# Patient Record
Sex: MALE | Race: WHITE | NOT HISPANIC OR LATINO | Employment: FULL TIME | URBAN - METROPOLITAN AREA
[De-identification: names, ages, dates, MRNs, and addresses within clinical notes are randomized per-mention and may not be internally consistent; named-entity substitution may affect disease eponyms.]

---

## 2017-03-04 ENCOUNTER — HOSPITAL ENCOUNTER (EMERGENCY)
Facility: OTHER | Age: 22
Discharge: HOME | End: 2017-03-04
Attending: EMERGENCY MEDICINE
Payer: MEDICAID

## 2017-03-04 VITALS
SYSTOLIC BLOOD PRESSURE: 132 MMHG | HEIGHT: 70 IN | BODY MASS INDEX: 27.2 KG/M2 | OXYGEN SATURATION: 98 % | HEART RATE: 70 BPM | WEIGHT: 190 LBS | RESPIRATION RATE: 16 BRPM | DIASTOLIC BLOOD PRESSURE: 74 MMHG | TEMPERATURE: 97 F

## 2017-03-04 DIAGNOSIS — K52.9 COLITIS, ACUTE: Primary | ICD-10-CM

## 2017-03-04 DIAGNOSIS — R10.32 LLQ ABDOMINAL PAIN: ICD-10-CM

## 2017-03-04 LAB
ANION GAP SERPL CALC-SCNC: 11 MMOL/L
BASOPHILS # BLD AUTO: 0.03 K/UL
BASOPHILS NFR BLD: 0.6 %
BILIRUB UR QL STRIP: NEGATIVE
BUN SERPL-MCNC: 11 MG/DL
CALCIUM SERPL-MCNC: 8.9 MG/DL
CHLORIDE SERPL-SCNC: 104 MMOL/L
CLARITY UR: CLEAR
CO2 SERPL-SCNC: 25 MMOL/L
COLOR UR: YELLOW
CREAT SERPL-MCNC: 1 MG/DL
DIFFERENTIAL METHOD: NORMAL
EOSINOPHIL # BLD AUTO: 0.2 K/UL
EOSINOPHIL NFR BLD: 3.5 %
ERYTHROCYTE [DISTWIDTH] IN BLOOD BY AUTOMATED COUNT: 12.8 %
EST. GFR  (AFRICAN AMERICAN): >60 ML/MIN/1.73 M^2
EST. GFR  (NON AFRICAN AMERICAN): >60 ML/MIN/1.73 M^2
GLUCOSE SERPL-MCNC: 84 MG/DL
GLUCOSE UR QL STRIP: NEGATIVE
HCT VFR BLD AUTO: 43.5 %
HGB BLD-MCNC: 14.4 G/DL
HGB UR QL STRIP: NEGATIVE
KETONES UR QL STRIP: NEGATIVE
LEUKOCYTE ESTERASE UR QL STRIP: NEGATIVE
LYMPHOCYTES # BLD AUTO: 1.7 K/UL
LYMPHOCYTES NFR BLD: 32 %
MCH RBC QN AUTO: 28.2 PG
MCHC RBC AUTO-ENTMCNC: 33.1 %
MCV RBC AUTO: 85 FL
MONOCYTES # BLD AUTO: 0.6 K/UL
MONOCYTES NFR BLD: 11.7 %
NEUTROPHILS # BLD AUTO: 2.8 K/UL
NEUTROPHILS NFR BLD: 52 %
NITRITE UR QL STRIP: NEGATIVE
PH UR STRIP: 6 [PH] (ref 5–8)
PLATELET # BLD AUTO: 257 K/UL
PMV BLD AUTO: 10.2 FL
POTASSIUM SERPL-SCNC: 4.2 MMOL/L
PROT UR QL STRIP: NEGATIVE
RBC # BLD AUTO: 5.11 M/UL
SODIUM SERPL-SCNC: 140 MMOL/L
SP GR UR STRIP: 1.02 (ref 1–1.03)
URN SPEC COLLECT METH UR: NORMAL
UROBILINOGEN UR STRIP-ACNC: NEGATIVE EU/DL
WBC # BLD AUTO: 5.37 K/UL

## 2017-03-04 PROCEDURE — 63600175 PHARM REV CODE 636 W HCPCS: Performed by: EMERGENCY MEDICINE

## 2017-03-04 PROCEDURE — 80048 BASIC METABOLIC PNL TOTAL CA: CPT

## 2017-03-04 PROCEDURE — 85025 COMPLETE CBC W/AUTO DIFF WBC: CPT

## 2017-03-04 PROCEDURE — 99284 EMERGENCY DEPT VISIT MOD MDM: CPT | Mod: 25

## 2017-03-04 PROCEDURE — 25000003 PHARM REV CODE 250: Performed by: EMERGENCY MEDICINE

## 2017-03-04 PROCEDURE — 81003 URINALYSIS AUTO W/O SCOPE: CPT

## 2017-03-04 PROCEDURE — 96361 HYDRATE IV INFUSION ADD-ON: CPT

## 2017-03-04 PROCEDURE — 96374 THER/PROPH/DIAG INJ IV PUSH: CPT

## 2017-03-04 PROCEDURE — 96375 TX/PRO/DX INJ NEW DRUG ADDON: CPT

## 2017-03-04 RX ORDER — CIPROFLOXACIN 500 MG/1
500 TABLET ORAL
Status: COMPLETED | OUTPATIENT
Start: 2017-03-04 | End: 2017-03-04

## 2017-03-04 RX ORDER — CIPROFLOXACIN 500 MG/1
500 TABLET ORAL 2 TIMES DAILY
Qty: 14 TABLET | Refills: 0 | Status: SHIPPED | OUTPATIENT
Start: 2017-03-04 | End: 2017-03-11

## 2017-03-04 RX ORDER — SODIUM CHLORIDE 9 MG/ML
1000 INJECTION, SOLUTION INTRAVENOUS
Status: COMPLETED | OUTPATIENT
Start: 2017-03-04 | End: 2017-03-04

## 2017-03-04 RX ORDER — KETOROLAC TROMETHAMINE 30 MG/ML
30 INJECTION, SOLUTION INTRAMUSCULAR; INTRAVENOUS
Status: COMPLETED | OUTPATIENT
Start: 2017-03-04 | End: 2017-03-04

## 2017-03-04 RX ORDER — ONDANSETRON 2 MG/ML
4 INJECTION INTRAMUSCULAR; INTRAVENOUS
Status: COMPLETED | OUTPATIENT
Start: 2017-03-04 | End: 2017-03-04

## 2017-03-04 RX ADMIN — SODIUM CHLORIDE 1000 ML: 0.9 INJECTION, SOLUTION INTRAVENOUS at 07:03

## 2017-03-04 RX ADMIN — ONDANSETRON 4 MG: 2 INJECTION, SOLUTION INTRAMUSCULAR; INTRAVENOUS at 07:03

## 2017-03-04 RX ADMIN — CIPROFLOXACIN HYDROCHLORIDE 500 MG: 500 TABLET, FILM COATED ORAL at 10:03

## 2017-03-04 RX ADMIN — KETOROLAC TROMETHAMINE 30 MG: 30 INJECTION, SOLUTION INTRAMUSCULAR at 07:03

## 2017-03-04 NOTE — ED AVS SNAPSHOT
[facility]  [address]  [facility] [address]               Semaj Nunez   3/4/2017  7:07 PM   ED    Description:  Male : 1995   Department:  [facility]           Your Care was Coordinated By:     Provider Role From To    Diana Drake MD Attending Provider [id number] --      Reason for Visit     Abdominal Pain           Diagnoses this Visit        Comments    Colitis, acute    -  Primary     LLQ abdominal pain           ED Disposition     None           To Do List           Follow-up Information     Follow up with [facility]. Schedule an appointment as soon as possible for a visit on 3/8/2017.    Specialty:  Internal Medicine    Contact information:    [address]  [facility] [address] [phone]    Additional information:    [facility], 8th Floor, [address]   Please park in [facility] Parking [facility]age.       These Medications        Disp Refills Start End    ciprofloxacin HCl (CIPRO) 500 MG tablet 14 tablet 0 3/4/2017 3/11/2017    Take 1 tablet (500 mg total) by mouth 2 (two) times daily. - Oral      [facility]     [facility] On Call Nurse Care Line -  Assistance  Registered nurses in the [facility] On Call Center provide clinical advisement, health education, appointment booking, and other advisory services.  Call for this free service at [phone].             Medications           Message regarding Medications     Verify the changes and/or additions to your medication regime listed below are the same as discussed with your clinician today.  If any of these changes or additions are incorrect, please notify your healthcare provider.        START taking these NEW medications        Refills    ciprofloxacin HCl (CIPRO) 500 MG tablet 0    Sig: Take 1 tablet (500 mg total) by mouth 2 (two) times daily.    Class: Print    Route: Oral      These medications were administered today         "Dose Freq    0.9%  NaCl infusion 1,000 mL ED 1 Time    Sig: Inject 1,000 mLs into the vein ED 1 Time.    Class: Normal    Route: Intravenous    ketorolac injection 30 mg 30 mg ED 1 Time    Sig: Inject 30 mg into the vein ED 1 Time.    Class: Normal    Route: Intravenous    ondansetron injection 4 mg 4 mg ED 1 Time    Sig: Inject 4 mg into the vein ED 1 Time.    Class: Normal    Route: Intravenous    ciprofloxacin HCl tablet 500 mg 500 mg ED 1 Time    Sig: Take 1 tablet (500 mg total) by mouth ED 1 Time.    Class: Normal    Route: Oral           Verify that the below list of medications is an accurate representation of the medications you are currently taking.  If none reported, the list may be blank. If incorrect, please contact your healthcare provider. Carry this list with you in case of emergency.           Current Medications     ciprofloxacin HCl (CIPRO) 500 MG tablet Take 1 tablet (500 mg total) by mouth 2 (two) times daily.    ciprofloxacin HCl tablet 500 mg Take 1 tablet (500 mg total) by mouth ED 1 Time.           Clinical Reference Information           Your Vitals Were     BP Pulse Temp Resp Height Weight    126/64 76 97.4 °F (36.3 °C) (Oral) 16 5' 10" (1.778 m) 86.2 kg (190 lb)    SpO2 BMI             99% 27.26 kg/m2         Allergies as of 3/4/2017     No Known Allergies      Immunizations Administered on Date of Encounter - 3/4/2017     None      ED Micro, Lab, POCT     Start Ordered       Status Ordering Provider    03/04/17 1917 03/04/17 1917  CBC auto differential  STAT      Final result     03/04/17 1917 03/04/17 1917  Basic metabolic panel  STAT      Final result     03/04/17 1906 03/04/17 1906  Urinalysis  STAT      Final result       ED Imaging Orders     Start Ordered       Status Ordering Provider    03/04/17 1917 03/04/17 1917  CT Renal Stone Study ABD Pelvis WO  1 time imaging      Final result         Discharge Instructions         Abdominal Pain    Abdominal pain is pain in the stomach or " belly area. Everyone has this pain from time to time. In many cases it goes away on its own. But abdominal pain can sometimes be due to a serious problem, such as appendicitis. So its important to know when to seek help.  Causes of abdominal pain  There are many possible causes of abdominal pain. Common causes in adults include:  · Constipation, diarrhea, or gas  · Stomach acid flowing back up into the esophagus (acid reflux or heartburn)  · Severe acid reflux, called GERD (gastroesophageal reflux disease)  · A sore in the lining of the stomach or small intestine (peptic ulcer)  · Inflammation of the gallbladder, liver, or pancreas  · Gallstones or kidney stones  · Appendicitis   · Intestinal blockage   · An internal organ pushing through a muscle or other tissue (hernia)  · Urinary tract infections  · In women, menstrual cramps, fibroids, or endometriosis  · Inflammation or infection of the intestines  Diagnosing the cause of abdominal pain  Your healthcare provider will do a physical exam help find the cause of your pain. If needed, tests will be ordered. Belly pain has many possible causes. So it can be hard to find the reason for your pain. Giving details about your pain can help. Tell your provider where and when you feel the pain, and what makes it better or worse. Also let your provider know if you have other symptoms such as:  · Fever  · Tiredness  · Upset stomach (nausea)  · Vomiting  · Changes in bathroom habits  Treating abdominal pain  Some causes of pain need emergency medical treatment right away. These include appendicitis or a bowel blockage. Other problems can be treated with rest, fluids, or medicines. Your healthcare provider can give you specific instructions for treatment or self-care based on what is causing your pain.  If you have vomiting or diarrhea, sip water or other clear fluids. When you are ready to eat solid foods again, start with small amounts of easy-to-digest, low-fat foods. These  include apple sauce, toast, or crackers.   When to seek medical care  Call 911 or go to the hospital right away if you:  · Cant pass stool and are vomiting  · Are vomiting blood or have bloody diarrhea or black, tarry diarrhea  · Have chest, neck, or shoulder pain  · Feel like you might pass out  · Have pain in your shoulder blades with nausea  · Have sudden, severe belly pain  · Have new, severe pain unlike any you have felt before  · Have a belly that is rigid, hard, and tender to touch  Call your healthcare provider if you have:  · Pain for more than 5 days  · Bloating for more than 2 days  · Diarrhea for more than 5 days  · A fever of 100.4°F (38.0°C) or higher, or as directed by your provider  · Pain that gets worse  · Weight loss for no reason  · Continued lack of appetite  · Blood in your stool  How to prevent abdominal pain  Here are some tips to help prevent abdominal pain:  · Eat smaller amounts of food at one time.  · Avoid greasy, fried, or other high-fat foods.  · Avoid foods that give you gas.  · Exercise regularly.  · Drink plenty of fluids.  To help prevent GERD symptoms:  · Quit smoking.  · Reduce alcohol and certain foods that increase stomach acid.  · Avoid aspirin and over-the-counter pain and fever medicines (NSAIDS or nonsteroidal anti-inflammatory drugs), if possible  · Lose extra weight.  · Finish eating at least 2 hours before you go to bed or lie down.  · Raise the head of your bed.  Date Last Reviewed: 7/1/2016  © 9193-4656 [employer]. [address] [place] [address], [place] [address]. All rights reserved. This information is not intended as a substitute for professional medical care. Always follow your healthcare professional's instructions.          [facility] Sign-Up     Activating your [facility] account is as easy as 1-2-3!     1) Visit [email or web address], select Sign Up Now, enter this activation code and your date of birth, then select Next.  [email or web address]  Expires: 4/18/2017   9:58 PM      2) Create a username and password to use when you visit [facility] in the future and select a security question in case you lose your password and select Next.    3) Enter your e-mail address and click Sign Up!    Additional Information  If you have questions, please e-mail [email or web address] or call [phone] to talk to our [facility] staff. Remember, [facility] is NOT to be used for urgent needs. For medical emergencies, dial 911.          [facility] complies with applicable Federal civil rights laws and does not discriminate on the basis of race, color, national origin, age, disability, or sex.        Language Assistance Services     ATTENTION: Language assistance services are available, free of charge. Please call [phone].      ATENCIÓN: Si habla vickieañol, tiene a burr disposición servicios gratuitos de asistencia lingüística. Llame al [phone].     CHÚ Ý: N?u b?n nói Ti?ng Vi?t, có các d?ch v? h? tr? ngôn ng? mi?n phí dành cho b?n. G?i s? [phone].

## 2017-03-05 NOTE — DISCHARGE INSTRUCTIONS
Abdominal Pain    Abdominal pain is pain in the stomach or belly area. Everyone has this pain from time to time. In many cases it goes away on its own. But abdominal pain can sometimes be due to a serious problem, such as appendicitis. So its important to know when to seek help.  Causes of abdominal pain  There are many possible causes of abdominal pain. Common causes in adults include:  · Constipation, diarrhea, or gas  · Stomach acid flowing back up into the esophagus (acid reflux or heartburn)  · Severe acid reflux, called GERD (gastroesophageal reflux disease)  · A sore in the lining of the stomach or small intestine (peptic ulcer)  · Inflammation of the gallbladder, liver, or pancreas  · Gallstones or kidney stones  · Appendicitis   · Intestinal blockage   · An internal organ pushing through a muscle or other tissue (hernia)  · Urinary tract infections  · In women, menstrual cramps, fibroids, or endometriosis  · Inflammation or infection of the intestines  Diagnosing the cause of abdominal pain  Your healthcare provider will do a physical exam help find the cause of your pain. If needed, tests will be ordered. Belly pain has many possible causes. So it can be hard to find the reason for your pain. Giving details about your pain can help. Tell your provider where and when you feel the pain, and what makes it better or worse. Also let your provider know if you have other symptoms such as:  · Fever  · Tiredness  · Upset stomach (nausea)  · Vomiting  · Changes in bathroom habits  Treating abdominal pain  Some causes of pain need emergency medical treatment right away. These include appendicitis or a bowel blockage. Other problems can be treated with rest, fluids, or medicines. Your healthcare provider can give you specific instructions for treatment or self-care based on what is causing your pain.  If you have vomiting or diarrhea, sip water or other clear fluids. When you are ready to eat solid foods again,  start with small amounts of easy-to-digest, low-fat foods. These include apple sauce, toast, or crackers.   When to seek medical care  Call 911 or go to the hospital right away if you:  · Cant pass stool and are vomiting  · Are vomiting blood or have bloody diarrhea or black, tarry diarrhea  · Have chest, neck, or shoulder pain  · Feel like you might pass out  · Have pain in your shoulder blades with nausea  · Have sudden, severe belly pain  · Have new, severe pain unlike any you have felt before  · Have a belly that is rigid, hard, and tender to touch  Call your healthcare provider if you have:  · Pain for more than 5 days  · Bloating for more than 2 days  · Diarrhea for more than 5 days  · A fever of 100.4°F (38.0°C) or higher, or as directed by your provider  · Pain that gets worse  · Weight loss for no reason  · Continued lack of appetite  · Blood in your stool  How to prevent abdominal pain  Here are some tips to help prevent abdominal pain:  · Eat smaller amounts of food at one time.  · Avoid greasy, fried, or other high-fat foods.  · Avoid foods that give you gas.  · Exercise regularly.  · Drink plenty of fluids.  To help prevent GERD symptoms:  · Quit smoking.  · Reduce alcohol and certain foods that increase stomach acid.  · Avoid aspirin and over-the-counter pain and fever medicines (NSAIDS or nonsteroidal anti-inflammatory drugs), if possible  · Lose extra weight.  · Finish eating at least 2 hours before you go to bed or lie down.  · Raise the head of your bed.  Date Last Reviewed: 7/1/2016  © 6700-5534 [employer]. [address] [place] [address], [place] [address]. All rights reserved. This information is not intended as a substitute for professional medical care. Always follow your healthcare professional's instructions.

## 2017-03-05 NOTE — ED NOTES
Pt presents to the ED with c/o LLQ 6/10 abdominal pain since Monday. Pt also reports has had dx1 episode of vomiting and has had nausea. Pt also report diarrhea, pt denies any fever. Pt reports the pain was intermittent but states it is constant now. Pt reports when it started he did feel the pain in the back when it started. Pt appears non toxic and in no signs of acute distress.

## 2017-03-05 NOTE — ED PROVIDER NOTES
Encounter Date: 3/4/2017    SCRIBE #1 NOTE: I, Anais Smith, am scribing for, and in the presence of,  Dr. Drake. I have scribed the entire note.       History     Chief Complaint   Patient presents with    Abdominal Pain     + intermittent lower left quad pain + nuasea x 1 episode of emesis.      Review of patient's allergies indicates:  No Known Allergies  HPI Comments: Time seen by provider: 7:12 PM    This is a 21 y.o. male who presents with complaint of left sided abdominal pain. He reports onset of symptoms was about 5 days ago. He describes the pain as sharp and intermittent at onset. The patient states the pain is currently constant. He notes associated back pain and one episode of vomiting but denies any blood in urine, penile discharge, diarrhea, fever or chills. The patient states he used Ibuprofen today with minimal relief of symptoms. He denies any changes in pain with movement, rest or eating. The patient denies any recent heavy lifting, trauma or injury to the back.     The history is provided by the patient.     History reviewed. No pertinent past medical history.  History reviewed. No pertinent surgical history.  History reviewed. No pertinent family history.  Social History   Substance Use Topics    Smoking status: Never Smoker    Smokeless tobacco: None    Alcohol use No     Review of Systems   Constitutional: Negative for chills and fever.   HENT: Negative for congestion and sore throat.    Eyes: Negative for redness and visual disturbance.   Respiratory: Negative for cough and shortness of breath.    Cardiovascular: Negative for chest pain and palpitations.   Gastrointestinal: Positive for abdominal pain, nausea and vomiting. Negative for diarrhea.   Genitourinary: Negative for dysuria.   Musculoskeletal: Positive for back pain.   Skin: Negative for rash.   Neurological: Negative for weakness and headaches.   Psychiatric/Behavioral: Negative for confusion.       Physical Exam   Initial  Vitals   BP Pulse Resp Temp SpO2   03/04/17 1808 03/04/17 1808 03/04/17 1808 03/04/17 1808 03/04/17 1808   140/86 83 16 97.4 °F (36.3 °C) 99 %     Physical Exam    Nursing note and vitals reviewed.  Constitutional: He appears well-developed and well-nourished. He is not diaphoretic. No distress.   HENT:   Head: Normocephalic and atraumatic.   Right Ear: External ear normal.   Mouth/Throat: Oropharynx is clear and moist.   Eyes: Conjunctivae and EOM are normal.   Neck: Normal range of motion. Neck supple. No JVD present.   Cardiovascular: Normal rate, regular rhythm and normal heart sounds. Exam reveals no gallop and no friction rub.    No murmur heard.  Pulmonary/Chest: Breath sounds normal. He has no wheezes. He has no rhonchi. He has no rales.   Abdominal: Soft. Bowel sounds are normal. There is tenderness. There is no rebound and no guarding.   Generalized abdominal pain greatest in the periumbilical region and LLQ.    Musculoskeletal: Normal range of motion. He exhibits tenderness. He exhibits no edema.   Lumbar paraspinal tenderness bilaterally. No midline C/T/L spine tenderness.    Neurological: He is alert and oriented to person, place, and time. He has normal strength.   Skin: Skin is warm and dry. No rash noted.         ED Course   Procedures  Labs Reviewed   URINALYSIS   CBC W/ AUTO DIFFERENTIAL   BASIC METABOLIC PANEL        Imaging Results         CT Renal Stone Study ABD Pelvis WO (Final result) Result time:  03/04/17 20:19:50    Final result by Richard Leger MD (03/04/17 20:19:50)    Impression:      1. Long segment of mild fat stranding and wall thickening involving the proximal sigmoid colon, nonspecific and likely representing mild colitis or diverticulitis.  No complications such as abscess or fistula is identified.    2.  No evidence of nephrolithiasis or hydronephrosis.  ______________________________________     Electronically signed by resident: RICHARD LEGER MD  Date:      03/04/17  Time:    20:14            As the supervising and teaching physician, I personally reviewed the images and resident's interpretation and I agree with the findings.          Electronically signed by: SUYAPA HERNANEDZ MD  Date:     03/04/17  Time:    20:19     Narrative:    Procedure comments: 5-mm axial images from the top of the kidneys to the base of the bladder were obtained without the use of contrast material per the renal stone study protocol.  Coronal, axial, and sagittal reformatted images were reviewed.    Comparison: None.    Findings:  The lung bases are unremarkable.  No significant pleural or pericardial fluid is identified.    The liver is normal in size and attenuation without focal abnormality.  The bile ducts, gallbladder, adrenal glands, and pancreas demonstrate no abnormality.  The stomach and small bowel are unremarkable without evidence of obstruction or inflammation.  A normal appendix is identified.  There is a long segment of mild stranding surrounding the proximal sigmoid colon with associated mild wall thickening, nonspecific and likely representing colitis or diverticulitis, as a few scattered diverticula are identified within this region.  No fluid collections are identified.  There is evidence of fistula formation.  The remaining colon is unremarkable.  The rectum is unremarkable.  There is no ascites or free air.    The kidneys are normal in size and position.  There is no evidence of nephrolithiasis.  The ureters are normal in course and caliber.  The urinary bladder is nondistended.  Bilateral phleboliths are identified within the pelvis.  There are no stones in the collecting system or hydronephrosis.    The aorta and venous structures of the abdomen demonstrate no abnormality.  There is no lymph node enlargement.  There are scattered lymph nodes throughout the mesentery, not enlarged by size criteria. The osseous structures are unremarkable.                 Medical Decision  Making:   History:   Old Medical Records: I decided to obtain old medical records.  Initial Assessment:   21-year-old male presents complaining of intermittent left lower quadrant abdominal pain has been present for approximately 5 days.  He also reports 1 episode of nausea and vomiting.  On exam he had generalized tenderness to deep palpation greatest in the left lower quadrant.  Clinical Tests:   Lab Tests: Reviewed and Ordered  Radiological Study: Ordered and Reviewed  ED Management:  On reassessment the patient reported symptomatic improvement after toradol.  Labs are obtained and without significant abnormalities.  Urinalysis was also negative for urinary tract infection.  CT of the abdomen and pelvis was concerning only for mild stranding and wall thickening of the proximal sigmoid colon which may be nonspecific for consistent with mild colitis.  Since the symptoms have been present now for 5 days he will be started on Cipro and instructed to follow-up with primary care in 3-5 days for reevaluation.  Patient was discharged home in stable condition.            Scribe Attestation:   Scribe #1: I performed the above scribed service and the documentation accurately describes the services I performed. I attest to the accuracy of the note.    Attending Attestation:           Physician Attestation for Scribe:  Physician Attestation Statement for Scribe #1: I, Dr. Drake, reviewed documentation, as scribed by Anais Smith in my presence, and it is both accurate and complete.                 ED Course     Clinical Impression:     1. Colitis, acute    2. LLQ abdominal pain                Diana Drake MD  03/04/17 [id number]

## 2018-11-26 ENCOUNTER — TELEPHONE (OUTPATIENT)
Dept: PRIMARY CARE CLINIC | Facility: CLINIC | Age: 23
End: 2018-11-26

## 2018-11-26 NOTE — TELEPHONE ENCOUNTER
----- Message from Elisa Vaca sent at 11/26/2018 12:38 PM CST -----  Contact: mom/Zo  ..Type:  Same Day Appointment Request    Caller is requesting a same day appointment.  Caller declined first available appointment listed below.      Name of Caller: Mom/Zo  When is the first available appointment? 11-27-18  Symptoms:  Consistent Vomiting  Best Call Back Number:    Additional Information: Zo is requesting for her son to be seen today,she stated her son has been vomiting all night, may have food poisoning.  Please call to advise  Thanks

## 2018-11-26 NOTE — TELEPHONE ENCOUNTER
Called patient's mother notified her that he has never been seen in here before. We are not taking any new medicaid patients at this time. States understading

## 2019-02-15 PROBLEM — M25.512 ACUTE PAIN OF LEFT SHOULDER: Status: ACTIVE | Noted: 2019-02-15

## 2019-04-12 PROBLEM — M25.512 ACUTE PAIN OF LEFT SHOULDER: Status: RESOLVED | Noted: 2019-02-15 | Resolved: 2019-04-12

## 2019-08-20 ENCOUNTER — HOSPITAL ENCOUNTER (EMERGENCY)
Facility: HOSPITAL | Age: 24
Discharge: HOME | End: 2019-08-21
Attending: EMERGENCY MEDICINE
Payer: MEDICAID

## 2019-08-20 VITALS
HEIGHT: 69 IN | WEIGHT: 190 LBS | SYSTOLIC BLOOD PRESSURE: 139 MMHG | HEART RATE: 83 BPM | RESPIRATION RATE: 16 BRPM | TEMPERATURE: 98 F | DIASTOLIC BLOOD PRESSURE: 78 MMHG | BODY MASS INDEX: 28.14 KG/M2 | OXYGEN SATURATION: 97 %

## 2019-08-20 DIAGNOSIS — S02.19XA CLOSED FRACTURE OF TEMPORAL BONE, INITIAL ENCOUNTER: Primary | ICD-10-CM

## 2019-08-20 DIAGNOSIS — S06.5XAA SUBDURAL HEMATOMA: ICD-10-CM

## 2019-08-20 PROCEDURE — 99284 PR EMERGENCY DEPT VISIT,LEVEL IV: ICD-10-PCS | Mod: ,,, | Performed by: EMERGENCY MEDICINE

## 2019-08-20 PROCEDURE — 99283 PR EMERGENCY DEPT VISIT,LEVEL III: ICD-10-PCS | Mod: ,,, | Performed by: NEUROLOGICAL SURGERY

## 2019-08-20 PROCEDURE — 99283 EMERGENCY DEPT VISIT LOW MDM: CPT | Mod: ,,, | Performed by: NEUROLOGICAL SURGERY

## 2019-08-20 PROCEDURE — 99284 EMERGENCY DEPT VISIT MOD MDM: CPT | Mod: 25

## 2019-08-20 PROCEDURE — 99284 EMERGENCY DEPT VISIT MOD MDM: CPT | Mod: ,,, | Performed by: EMERGENCY MEDICINE

## 2019-08-20 NOTE — ED NOTES
Semaj Nunez, a 24 y.o. male presents to the ED via EMS with CC patient was diagnosed with intracranial hemorrhage.        Patient identifiers verified verbally with patient and correct for Semaj Nunez.    LOC/ APPEARANCE: The patient is AAOx4. Pt is speaking appropriately, no slurred speech. Pt changed into hospital gown. Continuous cardiac monitor, cont pulse ox, and auto BP cuff applied to patient. Pt is clean and well groomed. No JVD visible. Pt reports pain level of 0. Pt updated on POC. Bed low and locked with side rails up x2, call bell in pt reach.  SKIN: Skin is warm dry and intact, and color is consistent with ethnicity. Capillary refill <3 seconds. No breakdown or brusing visible. Mucus membranes moist, acyanotic. Patient has bruising to right temple area.   RESPIRATORY: Airway is open and patent. Respirations-spontaneous, unlabored, regular rate, equal bilaterally on inspiration and expiration. No accessory muscle use noted. Lungs clear to auscultation in all fields bilaterally anterior and posterior.   CARDIAC: Patient has regular heart rate.  No peripheral edema noted, and patient has no c/o chest pain. Peripheral pulses present equal and strong throughout.  ABDOMEN: Soft and non-tender to palpation with no distention noted. Normoactive bowel sounds x4 quadrants. Pt has no complaints of abnormal bowel movements, denies blood in stool. Pt reports normal appetite.   NEUROLOGIC: Eyes open spontaneously and facial expression symmetrical. Pt behavior appropriate to situation, and pt follows commands. Pt reports sensation present in all extremities when touched with a finger, denies any numbness or tingling. PERRLA  MUSCULOSKELETAL: Spontaneous movement noted to all extremities. Hand  equal and leg strength strong +5 bilaterally.   : No complaints of frequency, burning, urgency or blood in the urine. No complaints of incontinence.

## 2019-08-21 ENCOUNTER — TELEPHONE (OUTPATIENT)
Dept: NEUROLOGY | Facility: CLINIC | Age: 24
End: 2019-08-21
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
## 2019-08-21 NOTE — ASSESSMENT & PLAN NOTE
24 year old male transferred from [facility] with CT findings significant for non-displaced right temporal bone fracture that extends into petrous portion of temporal bone and non-displaced right coronoid process fracture of the mandible. Physical exam remarkable for right central hemotympanum. No evidence of perforation or acute hearing loss. Franklin and Rinne Tests within normal limits. No evidence of serous otorrhea. No facial asymmetry. Able to open and close his mouth without difficulty. No malocclusion.    - No acute surgical intervention  - Follow-up Audiogram within 4 - 6 weeks  - Please call ENT with questions
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
## 2019-08-21 NOTE — SUBJECTIVE & OBJECTIVE
Medications:  Continuous Infusions:  Scheduled Meds:  PRN Meds:     No current facility-administered medications on file prior to encounter.      No current outpatient medications on file prior to encounter.       Review of patient's allergies indicates:  No Known Allergies    No past medical history on file.  No past surgical history on file.  Family History     None        Tobacco Use    Smoking status: Never Smoker   Substance and Sexual Activity    Alcohol use: Yes     Comment: socially    Drug use: No    Sexual activity: Not on file     Review of Systems   Constitutional: Negative for activity change, appetite change, fatigue and fever.   HENT: Negative for congestion, ear discharge, ear pain, facial swelling, hearing loss, nosebleeds, sore throat, tinnitus, trouble swallowing and voice change.    Eyes: Negative for discharge and itching.   Respiratory: Negative for apnea and chest tightness.    Cardiovascular: Negative for chest pain and leg swelling.   Gastrointestinal: Negative for abdominal distention and abdominal pain.   Endocrine: Negative for cold intolerance and heat intolerance.   Genitourinary: Negative for difficulty urinating and dysuria.   Musculoskeletal: Negative for arthralgias and back pain.   Neurological: Positive for headaches. Negative for dizziness and facial asymmetry.   Psychiatric/Behavioral: Negative for agitation and confusion.     Objective:     Vital Signs (Most Recent):  Temp: 98 °F (36.7 °C) (08/20/19 1752)  Pulse: 83 (08/20/19 2201)  Resp: 16 (08/20/19 1752)  BP: 139/78 (08/20/19 2201)  SpO2: 97 % (08/20/19 2201) Vital Signs (24h Range):  Temp:  [97.9 °F (36.6 °C)-98 °F (36.7 °C)] 98 °F (36.7 °C)  Pulse:  [67-83] 83  Resp:  [16-18] 16  SpO2:  [97 %-100 %] 97 %  BP: (118-144)/(68-97) 139/78     Weight: 86.2 kg (190 lb)  Body mass index is 28.06 kg/m².        Physical Exam  Appearance: Awake, alert and oriented. No acute distress.  Face: 5cm abrasion on right temporal skin. No  facial asymmetry.   Eyes: Pupils equal, round and reactive. Extraocular muscles intact. Vision grossly intact.  Nose: External appearance normal. No evidence of septal deviation. Inferior turbinates within normal limits  Ears:  Right: External appearance normal. External auditory canal within normal limits. Central hemotympanum. No evidence of perforation, infection or effusion.  Left: External appearance normal. External auditory canal within normal limits. Tympanic membrane translucent. No evidence of infection or effusion.  Franklin: No lateralization  Rinne:  Right: AC > BC  Left: AC > BC  Mouth: Mucosal membranes moist. Tongue mobile. Oropharynx clear and patent.  Neck: No anterior or posterior cervical lymphadenopathy.  Respiratory: Normal work of breathing. No stridor or stertor
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
## 2019-08-21 NOTE — ED PROVIDER NOTES
Signed out to me from previous attending pending head CT an ENT evaluation.  ENT states there is nothing to do, may follow up with audiology in about 4 weeks.  CT shows no worsening of intracranial injury or temporal bone fracture.  Patient remains neurologically intact. Stable for discharge.  Advised pt to follow up with PCP or return if concerning symptoms arise. Pt understands and agrees with plan. Will d/c home.           Luiz Biggs MD  08/20/19 7117

## 2019-08-22 ENCOUNTER — TELEPHONE (OUTPATIENT)
Dept: NEUROSURGERY | Facility: CLINIC | Age: 24
End: 2019-08-22

## 2019-08-22 DIAGNOSIS — S06.5XAA SDH (SUBDURAL HEMATOMA): Primary | ICD-10-CM

## 2019-08-22 DIAGNOSIS — S02.19XA CLOSED FRACTURE OF TEMPORAL BONE, INITIAL ENCOUNTER: ICD-10-CM

## 2019-08-22 DIAGNOSIS — S02.631A: ICD-10-CM

## 2019-09-19 ENCOUNTER — HOSPITAL ENCOUNTER (OUTPATIENT)
Dept: RADIOLOGY | Facility: HOSPITAL | Age: 24
Discharge: HOME | End: 2019-09-19
Attending: NEUROLOGICAL SURGERY
Payer: MEDICAID

## 2019-09-19 ENCOUNTER — OFFICE VISIT (OUTPATIENT)
Dept: NEUROSURGERY | Facility: CLINIC | Age: 24
End: 2019-09-19
Payer: MEDICAID

## 2019-09-19 VITALS
BODY MASS INDEX: 28.58 KG/M2 | WEIGHT: 193 LBS | DIASTOLIC BLOOD PRESSURE: 69 MMHG | SYSTOLIC BLOOD PRESSURE: 137 MMHG | HEART RATE: 97 BPM | TEMPERATURE: 97 F | HEIGHT: 69 IN

## 2019-09-19 DIAGNOSIS — S06.322D: Primary | ICD-10-CM

## 2019-09-19 DIAGNOSIS — S02.19XA CLOSED FRACTURE OF TEMPORAL BONE, INITIAL ENCOUNTER: ICD-10-CM

## 2019-09-19 DIAGNOSIS — S02.631A: ICD-10-CM

## 2019-09-19 DIAGNOSIS — S06.5XAA SDH (SUBDURAL HEMATOMA): ICD-10-CM

## 2019-09-19 DIAGNOSIS — S06.33AA: ICD-10-CM

## 2019-09-19 DIAGNOSIS — S02.91XB: ICD-10-CM

## 2019-09-19 PROCEDURE — 70450 CT HEAD/BRAIN W/O DYE: CPT | Mod: 26,,, | Performed by: RADIOLOGY

## 2019-09-19 PROCEDURE — 70450 CT HEAD/BRAIN W/O DYE: CPT | Mod: TC

## 2019-09-19 PROCEDURE — 99213 PR OFFICE/OUTPT VISIT, EST, LEVL III, 20-29 MIN: ICD-10-PCS | Mod: S$PBB,,, | Performed by: NEUROLOGICAL SURGERY

## 2019-09-19 PROCEDURE — 70450 CT HEAD WITHOUT CONTRAST: ICD-10-PCS | Mod: 26,,, | Performed by: RADIOLOGY

## 2019-09-19 PROCEDURE — 99999 PR PBB SHADOW E&M-EST. PATIENT-LVL III: ICD-10-PCS | Mod: PBBFAC,,, | Performed by: NEUROLOGICAL SURGERY

## 2019-09-19 PROCEDURE — 99213 OFFICE O/P EST LOW 20 MIN: CPT | Mod: PBBFAC,25 | Performed by: NEUROLOGICAL SURGERY

## 2019-09-19 PROCEDURE — 99999 PR PBB SHADOW E&M-EST. PATIENT-LVL III: CPT | Mod: PBBFAC,,, | Performed by: NEUROLOGICAL SURGERY

## 2019-09-19 PROCEDURE — 99213 OFFICE O/P EST LOW 20 MIN: CPT | Mod: S$PBB,,, | Performed by: NEUROLOGICAL SURGERY
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
## 2019-09-27 ENCOUNTER — TELEPHONE (OUTPATIENT)
Dept: NEUROSURGERY | Facility: CLINIC | Age: 24
End: 2019-09-27

## 2019-09-27 DIAGNOSIS — S06.33AA: Primary | ICD-10-CM

## 2019-09-27 DIAGNOSIS — S02.91XB: Primary | ICD-10-CM

## 2019-09-27 DIAGNOSIS — F41.9 ANXIETY: ICD-10-CM

## 2019-09-28 RX ORDER — ALPRAZOLAM 1 MG/1
1 TABLET ORAL 2 TIMES DAILY PRN
Qty: 30 TABLET | Refills: 0 | Status: SHIPPED | OUTPATIENT
Start: 2019-09-28 | End: 2019-10-28

## 2019-10-07 NOTE — ED NOTES
Pt is resting comfortably in stretcher with eyes open. Pt is AAOx4.  Respirations are full, even, and non labored. NAD noted. Pt denies pain at this time. No needs verbalized at this time. Call bell is in reach, side rails are up x 2, and bed is in lowest, locked, position. Pt asking for ETA for neurosurgery, neurosurgery team paged.     0 = independent

## 2019-11-05 ENCOUNTER — TELEPHONE (OUTPATIENT)
Dept: NEUROSURGERY | Facility: CLINIC | Age: 24
End: 2019-11-05

## 2019-11-05 NOTE — TELEPHONE ENCOUNTER
----- Message from Lucia Porter sent at 11/5/2019  3:12 PM CST -----  Contact: Pt   Pt would like to be called back regarding upcoming appointment     Pt can be reached at [phone]

## 2019-11-05 NOTE — TELEPHONE ENCOUNTER
----- Message from Teri Rodriguez sent at 11/5/2019  3:06 PM CST -----  Contact: Pt  Patient is requesting a call back in regards to scheduling appt      Please call      Phone [phone]

## 2019-11-07 ENCOUNTER — HOSPITAL ENCOUNTER (OUTPATIENT)
Dept: RADIOLOGY | Facility: HOSPITAL | Age: 24
Discharge: HOME | End: 2019-11-07
Attending: NEUROLOGICAL SURGERY
Payer: MEDICAID

## 2019-11-07 DIAGNOSIS — S02.91XB: ICD-10-CM

## 2019-11-07 DIAGNOSIS — S06.33AA: ICD-10-CM

## 2019-11-07 DIAGNOSIS — S06.322D: ICD-10-CM

## 2019-11-07 PROCEDURE — 70450 CT HEAD/BRAIN W/O DYE: CPT | Mod: TC

## 2019-11-07 PROCEDURE — 70450 CT HEAD WITHOUT CONTRAST: ICD-10-PCS | Mod: 26,,, | Performed by: RADIOLOGY

## 2019-11-07 PROCEDURE — 70450 CT HEAD/BRAIN W/O DYE: CPT | Mod: 26,,, | Performed by: RADIOLOGY

## 2019-11-18 ENCOUNTER — OFFICE VISIT (OUTPATIENT)
Dept: NEUROSURGERY | Facility: CLINIC | Age: 24
End: 2019-11-18
Payer: MEDICAID

## 2019-11-18 VITALS
SYSTOLIC BLOOD PRESSURE: 124 MMHG | TEMPERATURE: 98 F | WEIGHT: 193 LBS | DIASTOLIC BLOOD PRESSURE: 83 MMHG | BODY MASS INDEX: 28.58 KG/M2 | HEART RATE: 86 BPM | HEIGHT: 69 IN

## 2019-11-18 DIAGNOSIS — S06.330D CLOSED SKULL FRACTURE WITH CEREBRAL CONTUSION WITH ROUTINE HEALING, SUBSEQUENT ENCOUNTER: Primary | ICD-10-CM

## 2019-11-18 DIAGNOSIS — S02.91XD CLOSED SKULL FRACTURE WITH CEREBRAL CONTUSION WITH ROUTINE HEALING, SUBSEQUENT ENCOUNTER: Primary | ICD-10-CM

## 2019-11-18 PROCEDURE — 99213 OFFICE O/P EST LOW 20 MIN: CPT | Mod: S$PBB,,, | Performed by: NEUROLOGICAL SURGERY

## 2019-11-18 PROCEDURE — 99213 PR OFFICE/OUTPT VISIT, EST, LEVL III, 20-29 MIN: ICD-10-PCS | Mod: S$PBB,,, | Performed by: NEUROLOGICAL SURGERY

## 2019-11-18 PROCEDURE — 99213 OFFICE O/P EST LOW 20 MIN: CPT | Mod: PBBFAC | Performed by: NEUROLOGICAL SURGERY

## 2019-11-18 PROCEDURE — 99999 PR PBB SHADOW E&M-EST. PATIENT-LVL III: CPT | Mod: PBBFAC,,, | Performed by: NEUROLOGICAL SURGERY

## 2019-11-18 PROCEDURE — 99999 PR PBB SHADOW E&M-EST. PATIENT-LVL III: ICD-10-PCS | Mod: PBBFAC,,, | Performed by: NEUROLOGICAL SURGERY

## 2019-11-18 NOTE — PROGRESS NOTES
This office note has been dictated.  Semaj Nunez returned in neurosurgical followup to the office this afternoon.    He has been doing better over the past two months.  He feels that his hearing   has returned to normal and he did not see ENT for further evaluation.  He also   has had no more difficulty chewing or pain in the jaw.  He is pretty much back   to full activity and ready to return to work.    On brief examination today, he is alert and cooperative.  Examination of the   head shows no tenderness over the scalp, particularly over the right temporal   area.  Extraocular movements are intact and pupils equal and reactive to light.    Hearing is good to tuning fork in both ears and midline tuning fork is not   lateralized.  He can open and close the jaw without difficulty.  Finger-to-nose   was done well.  Gait is intact and he seems generally neurologically normal.    CT scan of the head was repeated at [facility] today and compared to his   previous scans of 08/20/19 and 09/19/19.  The temporal bone fracture is   difficult to find.  The small amount of pneumocephalus has completely resolved.    The left temporal contusion has also cleared and is not visible.  The CT is   essentially normal at this point.    I am happy to see he has gone on to good healing.  I have no further   recommendations for him.  I do not believe he requires specific neurosurgical   followup, but will of course be happy to see him back if new problems arise.        RDS/IN  dd: 11/18/2019 17:18:59 (CST)  td: 11/19/2019 05:30:58 (CST)  Doc ID   #[id number]  Job ID #[id number]    CC: Semaj Nunez

## 2020-03-18 ENCOUNTER — TELEPHONE (OUTPATIENT)
Dept: PRIMARY CARE CLINIC | Facility: CLINIC | Age: 25
End: 2020-03-18

## 2020-03-18 NOTE — TELEPHONE ENCOUNTER
Left vm to call back to schedule a video visit if possible. Also spoke with pt's mother who will have him call us

## 2020-03-18 NOTE — TELEPHONE ENCOUNTER
----- Message from Sole Noriega LPN sent at 3/18/2020  2:56 PM CDT -----  Contact: Rosalino [phone]      ----- Message -----  From: Hawa Hyatt  Sent: 3/18/2020   2:51 PM CDT  To: [facility] Staff    Patient is asking to be taken as a new patient . Patient has medicaid insureance

## 2020-03-23 ENCOUNTER — TELEPHONE (OUTPATIENT)
Dept: PRIMARY CARE CLINIC | Facility: CLINIC | Age: 25
End: 2020-03-23

## 2020-03-23 DIAGNOSIS — Z20.6 CONTACT WITH AND (SUSPECTED) EXPOSURE TO HUMAN IMMUNODEFICIENCY VIRUS (HIV): Primary | ICD-10-CM

## 2020-03-23 NOTE — TELEPHONE ENCOUNTER
Called pt about upcoming appointment. He states he was having sex and coddelmy broke wanting to get screened. No known STI contact. No hx of STI. Asymptomatic. Pan screen STIs today which he can walk in for lab draw. Goal to avoid all unnecessary clinic visit at this time. Pt voices understanding.

## 2021-04-16 ENCOUNTER — PATIENT MESSAGE (OUTPATIENT)
Dept: RESEARCH | Facility: HOSPITAL | Age: 26
End: 2021-04-16